# Patient Record
Sex: FEMALE | Race: WHITE | URBAN - METROPOLITAN AREA
[De-identification: names, ages, dates, MRNs, and addresses within clinical notes are randomized per-mention and may not be internally consistent; named-entity substitution may affect disease eponyms.]

---

## 2020-01-14 ENCOUNTER — APPOINTMENT (RX ONLY)
Dept: URBAN - METROPOLITAN AREA CLINIC 167 | Facility: CLINIC | Age: 60
Setting detail: DERMATOLOGY
End: 2020-01-14

## 2020-01-14 DIAGNOSIS — L71.8 OTHER ROSACEA: ICD-10-CM

## 2020-01-14 DIAGNOSIS — L82.1 OTHER SEBORRHEIC KERATOSIS: ICD-10-CM

## 2020-01-14 DIAGNOSIS — L73.8 OTHER SPECIFIED FOLLICULAR DISORDERS: ICD-10-CM

## 2020-01-14 DIAGNOSIS — Z71.89 OTHER SPECIFIED COUNSELING: ICD-10-CM

## 2020-01-14 PROCEDURE — ? EDUCATIONAL RESOURCES PROVIDED

## 2020-01-14 PROCEDURE — ? TREATMENT REGIMEN

## 2020-01-14 PROCEDURE — ? PRESCRIPTION

## 2020-01-14 PROCEDURE — ? OTHER

## 2020-01-14 PROCEDURE — 99202 OFFICE O/P NEW SF 15 MIN: CPT

## 2020-01-14 PROCEDURE — ? COUNSELING

## 2020-01-14 RX ORDER — METRONIDAZOLE 7.5 MG/G
CREAM TOPICAL
Qty: 1 | Refills: 5 | Status: ERX | COMMUNITY
Start: 2020-01-14

## 2020-01-14 RX ADMIN — METRONIDAZOLE: 7.5 CREAM TOPICAL at 00:00

## 2020-01-14 ASSESSMENT — LOCATION DETAILED DESCRIPTION DERM
LOCATION DETAILED: NASAL DORSUM
LOCATION DETAILED: RIGHT SUPERIOR CENTRAL MALAR CHEEK
LOCATION DETAILED: LEFT INFERIOR MEDIAL MALAR CHEEK
LOCATION DETAILED: RIGHT ANTERIOR DISTAL THIGH
LOCATION DETAILED: LEFT MEDIAL FOREHEAD

## 2020-01-14 ASSESSMENT — LOCATION ZONE DERM
LOCATION ZONE: FACE
LOCATION ZONE: NOSE
LOCATION ZONE: LEG

## 2020-01-14 ASSESSMENT — LOCATION SIMPLE DESCRIPTION DERM
LOCATION SIMPLE: RIGHT THIGH
LOCATION SIMPLE: RIGHT CHEEK
LOCATION SIMPLE: LEFT FOREHEAD
LOCATION SIMPLE: NOSE
LOCATION SIMPLE: LEFT CHEEK

## 2020-01-14 NOTE — PROCEDURE: OTHER
Detail Level: Detailed
Other (Free Text): spot on face\\nHas been present for 1 year\\nAppears to be changing\\n\\nSpot on leg\\nPresent for about 30 years\\nCrusty, dark
Note Text (......Xxx Chief Complaint.): This diagnosis correlates with the
Other (Free Text): Rosacea\\nWas diagnosed with it many years ago\\n\\nHad red bumps of rosacea in the past\\nUsed to use metronidazole 0.75% cream\\nIt worked previously\\nwould like a refill to have in case she needs it\\n\\nNow just gets redness\\nRarely gets bumps anymore\\n\\nEltaMD sunscreen \\n[employer] skin calming cleanser\\nThese are fine\\n\\nHas sensitive skin\\nDilated telangiectasia - can consider laser

## 2021-06-24 NOTE — PROCEDURE: TREATMENT REGIMEN
----- Message from Ruthie Baker MD sent at 6/23/2021  1:15 PM CDT -----  Regarding: pls check on patient-thanks  I tried to reach patient today 6/23/21 to check on her-- to see how she is doing given US result from 6/18/21-- unable to reach patient    Nurse derek or sushil associate-- please reach out to patient again to see how she is doing. Please provide emotional support if needed  Please advise her to f/up with dr Johnson on 7/6/21 and to monitor for s/sx of dizziness, SOB, abd pain that warrant Er visit     
Spoke to the pt as requested by Dr Baker. Per the pt, she is feeling \"fine\", no change in pain. Pt denied dizziness and sob, but was advised to go to the ER if those sxs do occur, as well as severe abd pain or vaginal bleeding and to keep her appt with Dr Johnson on 7/08/21. She voiced understanding of and compliance with the info given  
Plan: Avoid changing face products or adding new products if the products you are using are not irritating. \\nRecommended avoiding retinols.
Detail Level: Zone
Continue Regimen: Elta [employer] facial sunscreen

## 2023-07-17 ENCOUNTER — APPOINTMENT (RX ONLY)
Dept: URBAN - METROPOLITAN AREA CLINIC 170 | Facility: CLINIC | Age: 63
Setting detail: DERMATOLOGY
End: 2023-07-17

## 2023-07-17 DIAGNOSIS — L82.0 INFLAMED SEBORRHEIC KERATOSIS: ICD-10-CM

## 2023-07-17 PROBLEM — D48.5 NEOPLASM OF UNCERTAIN BEHAVIOR OF SKIN: Status: ACTIVE | Noted: 2023-07-17

## 2023-07-17 PROCEDURE — ? COUNSELING

## 2023-07-17 PROCEDURE — ? OTHER

## 2023-07-17 PROCEDURE — 11102 TANGNTL BX SKIN SINGLE LES: CPT

## 2023-07-17 PROCEDURE — ? BIOPSY BY SHAVE METHOD

## 2023-07-17 ASSESSMENT — LOCATION ZONE DERM: LOCATION ZONE: FACE

## 2023-07-17 ASSESSMENT — LOCATION SIMPLE DESCRIPTION DERM: LOCATION SIMPLE: LEFT FOREHEAD

## 2023-07-17 ASSESSMENT — LOCATION DETAILED DESCRIPTION DERM: LOCATION DETAILED: LEFT INFERIOR FOREHEAD

## 2023-07-17 NOTE — PROCEDURE: OTHER
Note Text (......Xxx Chief Complaint.): This diagnosis correlates with the
Other (Free Text): Present for years \\nchanging since June\\nBleeding, crusting \\n\\nhad novocaine in toenail\\nhad anxiety, heart was racing\\nI was able to numb her with lidocaine - no issues, no allergic reaction
Detail Level: Detailed
Render Risk Assessment In Note?: no